# Patient Record
(demographics unavailable — no encounter records)

---

## 2025-02-13 NOTE — HISTORY OF PRESENT ILLNESS
[FreeTextEntry1] : Feb 11, 2025      in person   first visit               His sister visits here.   PCP:  [facility] until retired 4 years ago at [employer]       Allergist: Ramiro Wild    CC:  Pruritus          Elevated TSH.  [facility] labs from 12/20/2024 include TSH 38.68 free T4 0.8   Takes vit D 2000 daily   Triamt/HCTZ KCL Losartan fenofibrate tamsulosin amlodipine  : : Constitutional:  Alert, well nourished, healthy appearance, no acute distress  Eyes:  No proptosis, no stare Thyroid: Pulmonary:  No respiratory distress, no accessory muscle use; normal rate and effort Cardiac:  Normal rate Vascular:  Endocrine:  No stigmata of Cushings Syndrome Musculoskeletal:  Normal gait, no involuntary movements Neurology:  No tremors Affect/Mood/Psych:  Oriented x 3; normal affect, normal insight/judgement, normal mood  . Imp:  Hypothyroid Plan:  LT4 25 mcg daily Updated labs in six weeks.   US thyroid.

## 2025-04-02 NOTE — HISTORY OF PRESENT ILLNESS
[FreeTextEntry1] : Apr 02, 2025    in person      PCP:  [facility] until retired 4 years ago at [employer]       Allergist: Ramiro Wild    beets, strawberries maker him itchy;  vinegar, wine  [phone]       Card: Dr. Timoteo Aburto            GI:  about 4 years ago     CC:  Pruritus          Elevated TSH.                 Hx R parotid surgery            (? memory issues)    In the course of evaluation for pruritus, his Dermatologist found elevated TSH. I sent him for US and he went to [facility] on [address], but I have not been able to retrieve the report.   [facility] labs from 12/20/2024 include TSH 38.68 free T4 0.8  creatinine 1.55   A1c 6.3    Started on levothyroxine 25 mcg daily and at [facility] on 3/20/2025 GSH 0.94, antiibodies negative.   Uric acid 8  Takes vit D 2000 daily   Triamt/HCTZ KCL Losartan fenofibrate tamsulosin amlodipine  Feb 11, 2025      in person   first visit               His sister visits here.   PCP:  [facility] until retired 4 years ago at [employer]       Allergist: Ramiro Wild    CC:  Pruritus          Elevated TSH.  [facility] labs from 12/20/2024 include TSH 38.68 free T4 0.8   Takes vit D 2000 daily   Triamt/HCTZ KCL Losartan fenofibrate tamsulosin amlodipine  : : Constitutional:  Alert, well nourished, healthy appearance, no acute distress  Eyes:  No proptosis, no stare Thyroid: Pulmonary:  No respiratory distress, no accessory muscle use; normal rate and effort Cardiac:  Normal rate Vascular:  Endocrine:  No stigmata of Cushings Syndrome Musculoskeletal:  Normal gait, no involuntary movements Neurology:  No tremors Affect/Mood/Psych:  Oriented x 3; normal affect, normal insight/judgement, normal mood  . Imp:  Hypothyroid Plan:  LT4 25 mcg daily Updated labs in six weeks.   US thyroid.

## 2025-06-20 NOTE — HEALTH RISK ASSESSMENT
[Fair] :  ~his/her~ mood as fair [Yes] : Yes [Monthly or less (1 pt)] : Monthly or less (1 point) [1 or 2 (0 pts)] : 1 or 2 (0 points) [Never (0 pts)] : Never (0 points) [No falls in past year] : Patient reported no falls in the past year [0] : 2) Feeling down, depressed, or hopeless: Not at all (0) [PHQ-2 Negative - No further assessment needed] : PHQ-2 Negative - No further assessment needed [PHQ-9 Negative - No further assessment needed] : PHQ-9 Negative - No further assessment needed [Never] : Never [No] : In the past 12 months have you used drugs other than those required for medical reasons? No [Patient reported colonoscopy was normal] : Patient reported colonoscopy was normal [HIV test declined] : HIV test declined [Hepatitis C test declined] : Hepatitis C test declined [Fully functional (bathing, dressing, toileting, transferring, walking, feeding)] : Fully functional (bathing, dressing, toileting, transferring, walking, feeding) [Fully functional (using the telephone, shopping, preparing meals, housekeeping, doing laundry, using] : Fully functional and needs no help or supervision to perform IADLs (using the telephone, shopping, preparing meals, housekeeping, doing laundry, using transportation, managing medications and managing finances) [Reviewed no changes] : Reviewed, no changes [Designated Healthcare Proxy] : Designated healthcare proxy [Name: ___] : Health Care Proxy's Name: [unfilled]  [Relationship: ___] : Relationship: [unfilled] [I will adhere to the patient's wishes.] : I will adhere to the patient's wishes. [Time Spent: ___ minutes] : Time Spent: [unfilled] minutes [Audit-CScore] : 1 [[id number]] : 0 [Change in mental status noted] : No change in mental status noted [Language] : denies difficulty with language [Behavior] : denies difficulty with behavior [Learning/Retaining New Information] : denies difficulty learning/retaining new information [Handling Complex Tasks] : denies difficulty handling complex tasks [Reasoning] : denies difficulty with reasoning [Spatial Ability and Orientation] : denies difficulty with spatial ability and orientation [ColonoscopyDate] : 08/20 [AdvancecareDate] : 06/20/25 [FreeTextEntry4] : Discussed with patient.  Suggested the discussion with the healthcare proxy about quality of life issues.  Suggested a discussion on withholding treatment and no further therapy in any given circumstance as determined by the patient.

## 2025-06-20 NOTE — HISTORY OF PRESENT ILLNESS
[de-identified] : Patient comes to the office for the first time for a physical.  Last physical was approximately 2020.  But since then has regular follow-ups with cardiology, nephrology and Endocrinology.  Followed by cardiology for coronary disease with MI in 2020.  Nephrology for slightly increased creatinine level without any acute intervention.  Endocrinology for hypothyroidism. No recent changes in exertional or functional ability.  Had a cold recently that lasted for couple weeks but now is better.  Differences now his blood pressure remains elevated.